# Patient Record
Sex: MALE | Race: WHITE
[De-identification: names, ages, dates, MRNs, and addresses within clinical notes are randomized per-mention and may not be internally consistent; named-entity substitution may affect disease eponyms.]

---

## 2018-04-11 ENCOUNTER — HOSPITAL ENCOUNTER (EMERGENCY)
Dept: HOSPITAL 31 - C.ER | Age: 28
Discharge: HOME | End: 2018-04-11
Payer: COMMERCIAL

## 2018-04-11 VITALS
DIASTOLIC BLOOD PRESSURE: 80 MMHG | SYSTOLIC BLOOD PRESSURE: 138 MMHG | TEMPERATURE: 97.9 F | OXYGEN SATURATION: 98 % | HEART RATE: 66 BPM | RESPIRATION RATE: 16 BRPM

## 2018-04-11 DIAGNOSIS — S43.015A: Primary | ICD-10-CM
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
## 2018-04-11 NOTE — C.PDOC
History Of Present Illness


28 year old male presents to the ED for evaluation of left shoulder pain which 

began earlier today. Patient states he attempted to reach for the ball while 

playing basketball, when he accidentally dislocated his shoulder. Patient 

denies any other injuries at this time. 


Time Seen by Provider: 04/11/18 22:45


Chief Complaint (Nursing): Upper Extremity Problem/Injury


History Per: Patient


History/Exam Limitations: no limitations


Onset/Duration Of Symptoms: Hrs


Current Symptoms Are (Timing): Still Present


Quality: "Pain"


Additional History Per: Patient





Past Medical History


Reviewed: Historical Data, Nursing Documentation, Vital Signs


Vital Signs: 


 Last Vital Signs











Temp  98.0 F   04/11/18 22:36


 


Pulse  107 H  04/11/18 22:36


 


Resp  20   04/11/18 22:36


 


BP  131/79   04/11/18 22:36


 


Pulse Ox  96   04/11/18 23:12














- Medical History


PMH: No Chronic Diseases


Surgical History: No Surg Hx


Family History: States: Unknown Family Hx





- Social History


Hx Alcohol Use: No


Hx Substance Use: No





- Immunization History


Hx Tetanus Toxoid Vaccination: No


Hx Influenza Vaccination: No


Hx Pneumococcal Vaccination: No





Review Of Systems


Musculoskeletal: Positive for: Shoulder Pain (left)





Physical Exam





- Physical Exam


Appears: Non-toxic, No Acute Distress


Skin: Normal Color, Warm, Dry


Head: Atraumatic, Normacephalic


Eye(s): bilateral: Normal Inspection


Neck: Supple


Chest: Symmetrical, No Deformity, No Tenderness


Cardiovascular: Rhythm Regular, No Murmur


Respiratory: Normal Breath Sounds, No Rales, No Rhonchi, No Wheezing


Extremity: No Normal ROM (limited in left shoulder secondary to pain ), 

Capillary Refill (less than 2 seconds ), Other (anterior dislocation noted to 

left shoulder )


Pulses: Left Brachial: Normal, Left Radial: Normal


Neurological/Psych: Oriented x3, Normal Speech, Normal Cognition, Normal Motor, 

Normal Sensation





ED Course And Treatment


O2 Sat by Pulse Oximetry: 96 (on RA)


Pulse Ox Interpretation: Normal





- Other Rad


  ** Left shoulder


X-Ray: Interpreted by Me


Interpretation: Initial images show anterior dislocation of left shoulder.  

Post reduction images shoud normal position.  No evidence of fracture.


Reevaluation Time: 23:26


Reassessment Condition: Improved





Orthopedic


Time Out: Side verified, Site verified


Performed by: Attending Physician


Diagnosis: Dislocation


Location: Anterior (left shoulder )


Capillary refill: Normal


Distal Sensation: Normal


Distal Motor Function: Normal


Capillary Refill: Normal


Compartment: Normal


Distal Sensation: Normal


Distal Motor Function: Normal


Notes:: 


left shoulder dislocation was reduced using Modified Milch Maneuver. Patient 

tolerated well with no complications. 





Disposition





- Disposition


Referrals: 


Declan Boo MD [Staff Provider] - 


Disposition: HOME/ ROUTINE


Disposition Time: 23:26


Condition: IMPROVED


Additional Instructions: 


Wear the shoulder immobilizer and be sure not to lift your arm over your head. 

Follow up with the orthopedist for medical clearance.


Instructions:  Shoulder Dislocation (DC)


Forms:  [employer] (English), Work Excuse





- Clinical Impression


Clinical Impression: 


 Shoulder dislocation








- Scribe Statement


The provider has reviewed the documentation as recorded by the Scribe (Annie Salinas)


Provider Attestation: 








All medical record entries made by the Scribe were at my direction and 

personally dictated by me. I have reviewed the chart and agree that the record 

accurately reflects my personal performance of the history, physical exam, 

medical decision making, and the department course for this patient. I have 

also personally directed, reviewed, and agree with the discharge instructions 

and disposition.

## 2018-04-12 NOTE — RAD
PROCEDURE:  Radiographs of the Left Shoulder



HISTORY:

R/O DISLOCATION







COMPARISON:

No prior.



FINDINGS:



BONES:

No fracture appreciated.



JOINTS:

Left humeral head anteroinferiorly dislocated



SOFT TISSUES:

Normal.



OTHER FINDINGS:

None. 



IMPRESSION:

Left shoulder anterior inferior dislocation

## 2018-04-12 NOTE — RAD
PROCEDURE:  Radiographs of the Left Shoulder



HISTORY:

post reduction







COMPARISON:

2/11/2018 at 22:46 hours



FINDINGS:



BONES:

Prior anterior inferior left humeral head dislocation glenoid fossa 

now reduced Alessandro midline per this single frontal view.



No gross fractures appreciated.



JOINTS:

Glenohumeral and acromioclavicular joints preserved. No 

osteoarthritis.



SOFT TISSUES:

Normal.



OTHER FINDINGS:

None. 



IMPRESSION:

Prior left anterior inferior shoulder dislocation - reduced